# Patient Record
Sex: MALE | Race: WHITE | Employment: FULL TIME | URBAN - METROPOLITAN AREA
[De-identification: names, ages, dates, MRNs, and addresses within clinical notes are randomized per-mention and may not be internally consistent; named-entity substitution may affect disease eponyms.]

---

## 2018-02-16 ENCOUNTER — OFFICE VISIT (OUTPATIENT)
Dept: ENT CLINIC | Age: 43
End: 2018-02-16

## 2018-02-16 VITALS — SYSTOLIC BLOOD PRESSURE: 130 MMHG | DIASTOLIC BLOOD PRESSURE: 80 MMHG

## 2018-02-16 DIAGNOSIS — H69.93 DISORDER OF BOTH EUSTACHIAN TUBES: Primary | ICD-10-CM

## 2018-02-16 DIAGNOSIS — J30.9 ALLERGIC SINUSITIS: ICD-10-CM

## 2018-02-16 PROCEDURE — 99203 OFFICE O/P NEW LOW 30 MIN: CPT | Performed by: OTOLARYNGOLOGY

## 2018-02-16 RX ORDER — METHYLPREDNISOLONE 4 MG/1
4 TABLET ORAL SEE ADMIN INSTRUCTIONS
Qty: 1 KIT | Refills: 0 | Status: SHIPPED | OUTPATIENT
Start: 2018-02-16

## 2018-02-16 RX ORDER — FLUTICASONE PROPIONATE 50 MCG
2 SPRAY, SUSPENSION (ML) NASAL DAILY
Qty: 1 BOTTLE | Refills: 1 | Status: SHIPPED | OUTPATIENT
Start: 2018-02-16

## 2018-02-16 ASSESSMENT — ENCOUNTER SYMPTOMS
RESPIRATORY NEGATIVE: 1
RHINORRHEA: 0
ALLERGIC/IMMUNOLOGIC NEGATIVE: 1
SORE THROAT: 0
VOICE CHANGE: 0
FACIAL SWELLING: 0
SINUS PAIN: 0
EYES NEGATIVE: 1
SINUS PRESSURE: 0
TROUBLE SWALLOWING: 0

## 2024-03-13 NOTE — PROGRESS NOTES
Patient Reached:  Yes___   No_X__    Voicemail Instructions Given: Yes_X__  No___  # Called: [phone]      Date: 3/21/2024      Arrival Time: 9:30 AM     Procedure Time:  11:00 AM      Location: [facility]. [place], [place]       -If you have not received your bowel prep kit and instructions, Please reach out to [facility].    -Please follow a Clear Liquid Diet the day prior to your procedure.    -No liquids after midnight. (Only water that is associated with the bowel prep)    -Responsible adult 18 or older to stay on site while you are here-drive you home-stay with you after    -Bring a complete list of all your medications and supplements including name,dose,how often taken the day of your procedure    -Follow any instructions your doctors office has given you regarding medications.    -If you normally take the following medications in the morning please do so the AM of your procedure with a small sip of water.         *Heart,blood pressure,seizure,thyroid or breathing medications-use your inhalers-bring any rescue inhalers with you DOS         *DO NOT take blood pressure medications ending in  \"conner\" or \"pril\"  the AM of procedure or evening prior    -Take half or your normal dose of any long acting insulins the night before your procedure-do not take any diabetic medications the AM of procedure    -Follow your doctors instructions regarding stopping or taking  any blood thinners-if you do not have instructions-call them    -Any questions call [facility]: [phone]    -Other           VISITOR POLICY(subject to change)     *The current policy is 2 visitors per patient. There are no children allowed under the age of 14. Mask at discretion of facility. All policies are subject to change.

## 2024-03-21 ENCOUNTER — ANESTHESIA (OUTPATIENT)
Dept: ENDOSCOPY | Age: 49
End: 2024-03-21
Payer: COMMERCIAL

## 2024-03-21 ENCOUNTER — HOSPITAL ENCOUNTER (OUTPATIENT)
Age: 49
Setting detail: OUTPATIENT SURGERY
Discharge: HOME | End: 2024-03-21
Attending: INTERNAL MEDICINE | Admitting: INTERNAL MEDICINE
Payer: COMMERCIAL

## 2024-03-21 ENCOUNTER — ANESTHESIA EVENT (OUTPATIENT)
Dept: ENDOSCOPY | Age: 49
End: 2024-03-21
Payer: COMMERCIAL

## 2024-03-21 VITALS
BODY MASS INDEX: 45.1 KG/M2 | HEIGHT: 70 IN | TEMPERATURE: 97.7 F | WEIGHT: 315 LBS | HEART RATE: 71 BPM | OXYGEN SATURATION: 95 % | RESPIRATION RATE: 17 BRPM | DIASTOLIC BLOOD PRESSURE: 86 MMHG | SYSTOLIC BLOOD PRESSURE: 119 MMHG

## 2024-03-21 DIAGNOSIS — Z12.11 COLON CANCER SCREENING: ICD-10-CM

## 2024-03-21 LAB
GLUCOSE BLD-MCNC: 104 MG/DL (ref 70–99)
GLUCOSE BLD-MCNC: 81 MG/DL (ref 70–99)
PERFORMED ON: ABNORMAL
PERFORMED ON: NORMAL

## 2024-03-21 PROCEDURE — 2709999900 HC NON-CHARGEABLE SUPPLY: Performed by: INTERNAL MEDICINE

## 2024-03-21 PROCEDURE — 3700000001 HC ADD 15 MINUTES (ANESTHESIA): Performed by: INTERNAL MEDICINE

## 2024-03-21 PROCEDURE — C1889 IMPLANT/INSERT DEVICE, NOC: HCPCS | Performed by: INTERNAL MEDICINE

## 2024-03-21 PROCEDURE — 3700000000 HC ANESTHESIA ATTENDED CARE: Performed by: INTERNAL MEDICINE

## 2024-03-21 PROCEDURE — 3609010600 HC COLONOSCOPY POLYPECTOMY SNARE/COLD BIOPSY: Performed by: INTERNAL MEDICINE

## 2024-03-21 PROCEDURE — 7100000000 HC PACU RECOVERY - FIRST 15 MIN: Performed by: INTERNAL MEDICINE

## 2024-03-21 PROCEDURE — 6360000002 HC RX W HCPCS: Performed by: NURSE ANESTHETIST, CERTIFIED REGISTERED

## 2024-03-21 PROCEDURE — 88305 TISSUE EXAM BY PATHOLOGIST: CPT

## 2024-03-21 PROCEDURE — 2500000003 HC RX 250 WO HCPCS: Performed by: NURSE ANESTHETIST, CERTIFIED REGISTERED

## 2024-03-21 PROCEDURE — 7100000011 HC PHASE II RECOVERY - ADDTL 15 MIN: Performed by: INTERNAL MEDICINE

## 2024-03-21 PROCEDURE — 2580000003 HC RX 258: Performed by: INTERNAL MEDICINE

## 2024-03-21 PROCEDURE — 7100000001 HC PACU RECOVERY - ADDTL 15 MIN: Performed by: INTERNAL MEDICINE

## 2024-03-21 PROCEDURE — 7100000010 HC PHASE II RECOVERY - FIRST 15 MIN: Performed by: INTERNAL MEDICINE

## 2024-03-21 DEVICE — WORKING LENGTH 235CM, WORKING CHANNEL 2.8MM
Type: IMPLANTABLE DEVICE | Status: FUNCTIONAL
Brand: RESOLUTION 360 CLIP

## 2024-03-21 RX ORDER — VALSARTAN 80 MG/1
80 TABLET ORAL DAILY
COMMUNITY

## 2024-03-21 RX ORDER — KETAMINE HCL IN NACL, ISO-OSM 100MG/10ML
SYRINGE (ML) INJECTION PRN
Status: DISCONTINUED | OUTPATIENT
Start: 2024-03-21 | End: 2024-03-21 | Stop reason: SDUPTHER

## 2024-03-21 RX ORDER — SODIUM CHLORIDE 9 MG/ML
INJECTION, SOLUTION INTRAVENOUS CONTINUOUS
Status: DISCONTINUED | OUTPATIENT
Start: 2024-03-21 | End: 2024-03-21 | Stop reason: HOSPADM

## 2024-03-21 RX ORDER — PROPOFOL 10 MG/ML
INJECTION, EMULSION INTRAVENOUS PRN
Status: DISCONTINUED | OUTPATIENT
Start: 2024-03-21 | End: 2024-03-21 | Stop reason: SDUPTHER

## 2024-03-21 RX ORDER — METFORMIN HYDROCHLORIDE 500 MG/1
500 TABLET, EXTENDED RELEASE ORAL 2 TIMES DAILY
COMMUNITY

## 2024-03-21 RX ORDER — SEMAGLUTIDE 0.68 MG/ML
1 INJECTION, SOLUTION SUBCUTANEOUS WEEKLY
COMMUNITY

## 2024-03-21 RX ORDER — OFLOXACIN 3 MG/ML
5 SOLUTION AURICULAR (OTIC) 2 TIMES DAILY
COMMUNITY

## 2024-03-21 RX ORDER — ATORVASTATIN CALCIUM 20 MG/1
20 TABLET, FILM COATED ORAL DAILY
COMMUNITY

## 2024-03-21 RX ORDER — LIDOCAINE HYDROCHLORIDE 20 MG/ML
INJECTION, SOLUTION INFILTRATION; PERINEURAL PRN
Status: DISCONTINUED | OUTPATIENT
Start: 2024-03-21 | End: 2024-03-21 | Stop reason: SDUPTHER

## 2024-03-21 RX ORDER — METOPROLOL SUCCINATE 50 MG/1
50 TABLET, EXTENDED RELEASE ORAL 2 TIMES DAILY
COMMUNITY

## 2024-03-21 RX ORDER — AMLODIPINE BESYLATE 10 MG/1
10 TABLET ORAL DAILY
COMMUNITY

## 2024-03-21 RX ORDER — DEXMEDETOMIDINE HYDROCHLORIDE 100 UG/ML
INJECTION, SOLUTION INTRAVENOUS PRN
Status: DISCONTINUED | OUTPATIENT
Start: 2024-03-21 | End: 2024-03-21 | Stop reason: SDUPTHER

## 2024-03-21 RX ADMIN — SODIUM CHLORIDE: 9 INJECTION, SOLUTION INTRAVENOUS at 10:18

## 2024-03-21 RX ADMIN — PROPOFOL 50 MG: 10 INJECTION, EMULSION INTRAVENOUS at 12:13

## 2024-03-21 RX ADMIN — PROPOFOL 40 MG: 10 INJECTION, EMULSION INTRAVENOUS at 12:33

## 2024-03-21 RX ADMIN — DEXMEDETOMIDINE HYDROCHLORIDE 2 MCG: 100 INJECTION, SOLUTION INTRAVENOUS at 12:22

## 2024-03-21 RX ADMIN — Medication 10 MG: at 12:26

## 2024-03-21 RX ADMIN — PROPOFOL 180 MCG/KG/MIN: 10 INJECTION, EMULSION INTRAVENOUS at 12:12

## 2024-03-21 RX ADMIN — Medication 10 MG: at 12:17

## 2024-03-21 RX ADMIN — Medication 10 MG: at 12:13

## 2024-03-21 RX ADMIN — DEXMEDETOMIDINE HYDROCHLORIDE 4 MCG: 100 INJECTION, SOLUTION INTRAVENOUS at 12:11

## 2024-03-21 RX ADMIN — Medication 10 MG: at 12:21

## 2024-03-21 RX ADMIN — DEXMEDETOMIDINE HYDROCHLORIDE 4 MCG: 100 INJECTION, SOLUTION INTRAVENOUS at 12:18

## 2024-03-21 RX ADMIN — LIDOCAINE HYDROCHLORIDE 100 MG: 20 INJECTION, SOLUTION INFILTRATION; PERINEURAL at 12:12

## 2024-03-21 RX ADMIN — Medication 10 MG: at 12:33

## 2024-03-21 RX ADMIN — PROPOFOL 50 MG: 10 INJECTION, EMULSION INTRAVENOUS at 12:40

## 2024-03-21 ASSESSMENT — PAIN - FUNCTIONAL ASSESSMENT
PAIN_FUNCTIONAL_ASSESSMENT: ACTIVITIES ARE NOT PREVENTED
PAIN_FUNCTIONAL_ASSESSMENT: 0-10

## 2024-03-21 ASSESSMENT — LIFESTYLE VARIABLES: SMOKING_STATUS: 0

## 2024-03-21 NOTE — PROGRESS NOTES
HEMOCLIP    A HEMOCLIP METALLIC CLIPPING DEVICE HAS BEEN PLACED DURING YOUR PROCEDURE.    THIS DEVICE WILL NATURALLY PASS IN 7-14 DAYS WHEN MAKING A BOWEL MOVEMENT   Clip x4 Sigmoid Colon

## 2024-03-21 NOTE — PROGRESS NOTES
Reviewed patient's medical and surgical history in electronic record and with patient at the bedside. All questions regarding procedure answered.   Scope verified using 2 person system.  Family in waiting room.

## 2024-03-21 NOTE — BRIEF OP NOTE
Brief Postoperative Note - Full Note in Chart Review/Procedures tab       Patient: Blake Oakes  YOB: 1975  MRN: [id number]    Date of Procedure: 3/21/2024    Pre-Op Diagnosis Codes:     * Colon cancer screening [Z12.11]    Post-Op Diagnosis: Same       Procedure(s):  COLONOSCOPY POLYPECTOMY SNARE/HOT    Surgeon(s):  Mason Davies MD    Assistant:  * No surgical staff found *    Anesthesia: Monitor Anesthesia Care    Estimated Blood Loss (mL): Minimal    Complications: None    Specimens:   ID Type Source Tests Collected by Time Destination   A :  Tissue Colon SURGICAL PATHOLOGY Mason Davies MD 3/21/2024 1225    B :  Tissue Colon SURGICAL PATHOLOGY Mason Davies MD 3/21/2024 1232        Implants:  Implant Name Type Inv. Item Serial No.  Lot No. LRB No. Used Action   CLIP ENDOSCP 235CM RESOL 360 ORDER UOM IS EACH - [id number]  CLIP ENDOSCP 235CM RESOL 360 ORDER UOM IS EACH  [employer]  N/A 4 Implanted         Drains: * No LDAs found *    Findings:   18 mm pedunculated sigmoid colon poly removed with hot snare and site closed with four hemoclips for bleeding.  Two 2 mm and 4 mm transverse colon polyps removed with cold snare  Mild sigmoid diverticulosis    Rec:  Resume diet  Continue present treatment.  Await pathology results.   F/U WITH ME AS NEEDED  Colonoscopy in 3 years depending on pathology.  Followup with referring physician as previously instucted      Electronically signed by Mason Davies MD on 3/21/2024 at 12:53 PM

## 2024-03-21 NOTE — ANESTHESIA PRE PROCEDURE
Date   • Diabetes mellitus (HCC)    • Hyperlipidemia    • Hypertension        Past Surgical History:        Procedure Laterality Date   • ASPIRATION BIOPSY  03/19/2024   • ORIF ANKLE FRACTURE         Social History:    Social History     Tobacco Use   • Smoking status: Never   • Smokeless tobacco: Never   Substance Use Topics   • Alcohol use: Not on file     Comment: rare                                Counseling given: Not Answered      Vital Signs (Current):   Vitals:    03/21/24 0951   BP: 126/70   Pulse: 74   Resp: 20   Temp: 97.9 °F (36.6 °C)   TempSrc: Temporal   SpO2: 96%   Weight: (!) 170.6 kg (376 lb)   Height: 1.778 m (5' 10\")                                              BP Readings from Last 3 Encounters:   03/21/24 126/70   02/16/18 130/80       NPO Status: Time of last liquid consumption: 0600                        Time of last solid consumption: 1800                        Date of last liquid consumption: 03/21/24                        Date of last solid food consumption: 03/19/24    BMI:   Wt Readings from Last 3 Encounters:   03/21/24 (!) 170.6 kg (376 lb)     Body mass index is 53.95 kg/m².    CBC: No results found for: \"WBC\", \"RBC\", \"HGB\", \"HCT\", \"MCV\", \"RDW\", \"PLT\"    CMP: No results found for: \"NA\", \"K\", \"CL\", \"CO2\", \"BUN\", \"CREATININE\", \"GFRAA\", \"AGRATIO\", \"LABGLOM\", \"GLUCOSE\", \"GLU\", \"PROT\", \"CALCIUM\", \"BILITOT\", \"ALKPHOS\", \"AST\", \"ALT\"    POC Tests:   Recent Labs     03/21/24  1014   POCGLU 104*       Coags: No results found for: \"PROTIME\", \"INR\", \"APTT\"    HCG (If Applicable): No results found for: \"PREGTESTUR\", \"PREGSERUM\", \"HCG\", \"HCGQUANT\"     ABGs: No results found for: \"PHART\", \"PO2ART\", \"OLI5TEM\", \"QKA8MIK\", \"BEART\", \"T9GGYRMQ\"     Type & Screen (If Applicable):  No results found for: \"LABABO\", \"LABRH\"    Drug/Infectious Status (If Applicable):  No results found for: \"HIV\", \"HEPCAB\"    COVID-19 Screening (If Applicable): No results found for: \"COVID19\"        Anesthesia

## 2024-03-21 NOTE — PROGRESS NOTES
Discharge instructions review with patient and wife. All home medications have been reviewed, pt v/u. Discharge instructions signed. Pt discharged via wheelchair. Pt discharged with clip card and  all belongings. wife taking stable pt home.

## 2024-03-21 NOTE — H&P
Gastroenterology Note             Pre-operative History and Physical    Patient: Blake Oakes  : 1975  CSN:     History Obtained From:  patient and/or guardian.     HISTORY OF PRESENT ILLNESS:    The patient is a 49 y.o. male  here for average risk screening colonoscopy.      Past Medical History:    Past Medical History:   Diagnosis Date    Diabetes mellitus (HCC)     Hyperlipidemia     Hypertension      Past Surgical History:    Past Surgical History:   Procedure Laterality Date    ASPIRATION BIOPSY  2024    ORIF ANKLE FRACTURE       Medications Prior to Admission:   No current facility-administered medications on file prior to encounter.     Current Outpatient Medications on File Prior to Encounter   Medication Sig Dispense Refill    metFORMIN (GLUCOPHAGE-XR) 500 MG extended release tablet Take 1 tablet by mouth in the morning and at bedtime      metoprolol succinate (TOPROL XL) 50 MG extended release tablet Take 1 tablet by mouth in the morning and at bedtime      Semaglutide,0.25 or 0.5MG/DOS, (OZEMPIC, 0.25 OR 0.5 MG/DOSE,) 2 MG/3ML SOPN Inject 1 Dose into the skin once a week      amLODIPine (NORVASC) 10 MG tablet Take 1 tablet by mouth daily      valsartan (DIOVAN) 80 MG tablet Take 1 tablet by mouth daily      atorvastatin (LIPITOR) 20 MG tablet Take 1 tablet by mouth daily      ofloxacin (FLOXIN) 0.3 % otic solution 5 drops 2 times daily      methylPREDNISolone (MEDROL, TERRY,) 4 MG tablet Take 1 tablet by mouth See Admin Instructions Take by mouth. 1 kit 0    fluticasone (FLONASE) 50 MCG/ACT nasal spray 2 sprays by Nasal route daily 1 Bottle 1        Allergies:  Patient has no known allergies.      Social History:   Social History     Tobacco Use    Smoking status: Never    Smokeless tobacco: Never   Substance Use Topics    Alcohol use: Not on file     Comment: rare     Family History:   History reviewed. No pertinent family history.    PHYSICAL EXAM:      /70   Pulse  74   Temp 97.9 °F (36.6 °C) (Temporal)   Resp 20   Ht 1.778 m (5' 10\")   Wt (!) 170.6 kg (376 lb)   SpO2 96%   BMI 53.95 kg/m²  I        Heart:   RRR, normal s1s2    Lungs:  CTA bilat,  Normal effort    Abdomen:   NT, ND      ASA Grade:  ASA 3 - Patient with moderate systemic disease with functional limitations    Mallampati Class:  Class I: Soft palate, uvula, fauces, pillars visible  __________  Class II: Soft palate, uvula, fauces visible  __________   Class III: Soft palate, base of uvula visible  _____X____  Class IV: Hard palate only visible   __________        ASSESSMENT AND PLAN:    1.  Patient is a 49 y.o. male here for colonoscopy with MAC.   2.  Procedure options, risks and benefits reviewed with patient.  Patient expresses understanding.     Mason Davies MD  [facility]  3/21/2024

## 2024-03-21 NOTE — ANESTHESIA POSTPROCEDURE EVALUATION
Department of Anesthesiology  Postprocedure Note    Patient: Blake Oakes  MRN: [id number]  YOB: 1975  Date of evaluation: 3/21/2024    Procedure Summary       Date: 03/21/24 Room / Location: [place] [address] / [facility]    Anesthesia Start: 1208 Anesthesia Stop: 1258    Procedure: COLONOSCOPY POLYPECTOMY SNARE/HOT Diagnosis:       Colon cancer screening      (Colon cancer screening [Z12.11])    Surgeons: Mason Davies MD Responsible Provider: EMMANUEL Kim MD    Anesthesia Type: MAC ASA Status: 3            Anesthesia Type: No value filed.    Marvin Phase I: Marvin Score: 10    Marvin Phase II: Marvin Score: 10    Anesthesia Post Evaluation    Patient location during evaluation: PACU  Patient participation: complete - patient participated  Level of consciousness: awake and alert  Pain score: 0  Airway patency: patent  Nausea & Vomiting: no nausea  Cardiovascular status: blood pressure returned to baseline  Respiratory status: acceptable  Hydration status: euvolemic  Pain management: adequate    No notable events documented.

## 2024-03-21 NOTE — PROGRESS NOTES
Pt arrived from endo to PACU bay 8. Reported received from endo rn/crna staff. Pt  arousable to voice. Pt on RA, NSR, and VSS. Will continue to monitor.

## (undated) DEVICE — BW-412T DISP COMBO CLEANING BRUSH: Brand: SINGLE USE COMBINATION CLEANING BRUSH

## (undated) DEVICE — "SD-221U-25 DISP.CRESCENT SNARE-25MM": Brand: DISPOSABLE ELECTROSURGICAL SNARE

## (undated) DEVICE — SINGLE USE AIR/WATER, SUCTION AND BIOPSY VALVES SET: Brand: ORCAPOD™

## (undated) DEVICE — AIR/WATER CLEANING ADAPTER FOR OLYMPUS® GI ENDOSCOPE: Brand: BULLDOG®

## (undated) DEVICE — ELECTRODE PT RET AD L9FT HI MOIST COND ADH HYDRGEL CORDED

## (undated) DEVICE — ENDOSCOPIC KIT 6X3/16 FT COLON W/ 1.1 OZ 2 GWN W/O BRSH

## (undated) DEVICE — SOLUTION IV IRRIG WATER 500ML POUR BRL ST 2F7113

## (undated) DEVICE — TRAP SPEC RETRV CLR PLAS POLYP IN LN SUCT QUIK CTCH